# Patient Record
(demographics unavailable — no encounter records)

---

## 2025-01-17 NOTE — DISCUSSION/SUMMARY
[EKG obtained to assist in diagnosis and management of assessed problem(s)] : EKG obtained to assist in diagnosis and management of assessed problem(s) [FreeTextEntry1] : 63 y.o. M - referred by Dr. Meredith - s/p Ao Dissection, s/p Tamponade, mild pericardial effusion, AF s/p Tachymediated CM - repaired by Dr. Meredith - p/f evaluation -------------- --------------  Ankle-brachial index  postexercise declined to 1.04 on the right and 0.70 on the left. ABIs 15  minutes post exercise were 1.03 and 0.83, respectively. SAMUEL - 7-2019 -Impression: Study limited by poor compressibility of the arteries.  TTE 11/2018 - unchanged; diastolic dysfunction Reduced DP pulses  SAMUEL -  - Flow limiting - Monitoring --------------------- --------------------- 1. SOB/ S/P Tamponade - Off Lasix  - Treat empirically for pericarditis if recurs  2. HTN - Continue Toprol - Touch base with  - No need for Valartan - still 110/70 with prior dizziness. Touch base ith Omer She really prefers he is on ACEI/ARB or for nephropathy (still spilling protein) Continue Valsartan 75 - monitor BPs  3. T2DM - Metformin - Januvia  4. HTN and Dizziness - Amlodipine to dissucww with Omer  PAD - Moinitor  AF during ureteral stent - Eliquis 5 BID - Amio - Dr. Jacobs Cannot stop Eliquis for ureteral stent until 4/9 (1 mo post [facility])  EKG - for monitoring of heart disease

## 2025-01-17 NOTE — REASON FOR VISIT
[Follow-Up - Clinic] : a clinic follow-up of [FreeTextEntry2] : Aortic Dissection (Stable; Chronic). T2DM/Obesity (Stable; Chronic). Refractory Hypertension (Stable; Chronic). Patient-Risk (High).

## 2025-01-17 NOTE — HISTORY OF PRESENT ILLNESS
[FreeTextEntry1] :  Perioperative Recommendations (and cardiac clearance) for JOI GEE for placement of urethral stent   - EKG without ischemic changes - Patient is asymptomatic and stable from a cardiac standpoint.  - JOI requires no further cardiac testing prior to procedure and may proceed from cardiac standpoint. -Stop eliquis 2 days prior to procedure  and re-start within 1-2 days after procedure   Héctor Ceja MD    66 y.o. M - referred by Dr. Meredith - s/p Ao Dissection, s/p Tamponade, volume overload, CAD Non-obstructive, Hip Fx - repaired by Dr. Meredith, AF during ureteral stent  - p/f evaluation ------------------ ============ =========== 05/2017 Reports doing well since surgery. Doing well in general. Occasional PND. +Orthopnea.  Off of Amiodarone Some build of swelling ---------- 05/30/2017 Was initially diuresed. Now Off diuretics due to lightheadedness. Concerned about heart problems. Keeps him up at night.  TTE nl except small anterior pericardial effusion  Walks 3-4 blocks before jt pain No SOB/Palps/CP on exrtion  ------------ 08/2017 Admitted for SOB. Increased Lasix. Echo unchanged.  Lasix dose increased from 20 to 40.  ------------ 09/2017 Since last visit, Lasix was at 40 every other day. Prior to this, there was increased BNP to > 900, but Cr 0.76. Still troubled by surgery.  LE better. --------------- 12/2017 On last visit, BNP was 310. Planned to go for hypertension trial with Xena Maurer and Mustapha.  Recent visit to the emergency room with SOB and reduced ambulation; GONZALEZ.  Gets dizzy walking and SOB on limited exertion.  ------------------- 12/20/17 Improved SOB. Improved dizziness. Improved BP. ------------------- 04/2018 Breathing remains stable. BP stable.  One episode of dizziness prompting admission. Discussed taking Lasix less.  HTN/s/p Ao Dissection Repair/T2DM/Obesity - controlled on current treatment, No CP/SOB/Palps/LE Edema/Orthopnea attributable to these medical issues. History obtained from patient and pertinent family members. Outside records reviewed. Labs reviewed from EMR. ------------------- 11/2018 Last visit, discussed taking less Lasix due to dizziness.  Now completely off of it  TTE unchanged today HTN/s/p Ao Dissection Repair/T2DM/Obesity - controlled on current treatment, Checking bloodwork.  No CP/SOB/Palps/LE Edema/Orthopnea attributable to these medical issues. History obtained from patient and pertinent family members. Outside records reviewed. Labs reviewed from EMR. ------------------- 05/2019 Still with dyspnea. Some claudication.  Patient exercised on the treadmill for 5 minutes. Ankle-brachial index  postexercise declined to 1.04 on the right and 0.70 on the left. ABIs 15  minutes post exercise were 1.03 and 0.83, respectively. SAMUEL - 7-2019 -Impression: Study limited by poor compressibility of the arteries.  Exercise stress study demonstrates mild to moderate degree of  exercise-induced flow limitation bilaterally, worse on the left. Consider  arterial duplex study for further evaluation. HTN/s/p Ao Dissection Repair/T2DM/Obesity - controlled on current treatment, Checking bloodwork.  No CP/SOB/Palps/LE Edema/Orthopnea attributable to these medical issues. History obtained from patient and pertinent family members. Outside records reviewed. Labs reviewed from EMR. ---------------------------------   Getting more SOB. On Lasix 20mg doing better. Abnormal SAMUEL Needs PVR/?Galmer -------------------------------------  Very Depressed over medical problems - "no answers" Stopping lasix due to dizziness.  Orthostatic 95/60; 80/55. Stop irbesartan. Reduce coffee.  --------------------------------------  Very Depressed over medical problems - "no answers" Feels limited Stopping lasix due to dizziness.  Orthostatic 95/60; 80/55. Stop irbesartan now back on valsartan.  -----------------------------------  Nuclear Stress -  - Left ventricle enlarged; medium-sized, mild to moderate defects in the basal to mid-inferior, basal septal, and basal inferolateral walls - that are partially reversible suggestive of infarct with mild brooks-infarct. Mid to Distal anteroseptal and apex infarct.  [facility]--Non-obstructive CAD; Calcified vessels. Need to find cth report Predominantly dizziness; not necessarily SOB.  Has been off Lasix.  On valsartan. On Metoprolol  -------------------------------------  Re-started on antihypertensive for kidney protection from DM Touch base lan Tello She really prefers he is on ACEI/ARB or for nephropathy (still spilling protein) Continue Valsartan 75 - monitor BPs ---------------------------------------  No more claudication ----------------------------------------  Got a kidney lesion removed.  Causing hydronephrosis as they nicked the ureter Preopping  ===========================  Getting another ureteral procedure  -----------------------------------------  H[place] broken  [place]  Look into it and send to pulmonary  ----------------------------------------  Otherwise doing okay  -----------------  Lives now in [place] and comes up for doctrors.  -----------------  Went on ozempic, lost wt  Maintaing No symptoms ----------------- -----------------  AF during ureteral stent Next appt stop ASA 81 and then continue eliquis only Should stop diclofenac Check TTE  ----------------- -----------------  Had Rapid AF -> Reduced LVEF ->  DCCV/Amio  -> Now doing better Now on Amio 200 mg daily now  Cannot stop Eliquis for ureteral stent until 4/9 (1 mo post DCCV) ----------------- -----------------  Needs Ureteral stent released ----------------- -----------------  In [place] s/p AF ablation 6-2023 ablation  ----------------- -----------------  They are concerned re: CA given Wt Loss ----------------- ----------------- 1-2025 CC: Heart Issues - Notes No CP/SOB/Palps/Leg swelling - Reports No F/C/N/V/Headaches - Reports Normal Exercise Tolerance - Reports no medication changes - Reports normal mood/quality of life - Reports no diet changes - Reports no body aches - Reports no recent colds/viruses - Recent labs/imaging reviewed - Relevant Family history reviewed Mother/Father - CV Risk Assessment for 10 Year ACC/AHA Pooled Risk Cohort Equation places this person at > 7.5% Risk of ASCVD  ======================= ======================== ====================== ======================= ---------- EF (Teicholtz): 61 % Doppler Peak Velocity (m/sec): AoV=1.1 ------------------------------------------------------------------------ Observations: Mitral Valve: Normal mitral valve. Minimal mitral regurgitation. Aortic Valve/Aorta: Calcified aortic valve. Peak transaortic valve gradient equals 5 mm Hg, mean transaortic valve gradient equals 3 mm Hg, aortic valve velocity time integral equals 22 cm, estimated aortic valve area equals 2.1 sqcm. Peak left ventricular outflow tract gradient equals 1 mm Hg, mean gradient is equal to 1 mm Hg, LVOT velocity time integral equals 14 cm. Aortic Root: 3.3 cm. Left Atrium: Left atrial enlargement. Left Ventricle: Normal left ventricular systolic function. No segmental wall motion abnormalities. Mild concentric left ventricular hypertrophy. Right Heart: Normal right atrium. Normal right ventricular size and function. Normal tricuspid valve. Minimal tricuspid regurgitation. Normal pulmonic valve. Minimal pulmonic regurgitation. Pericardium/Pleura: Normal pericardium with no pericardial effusion. Hemodynamic: Estimated right atrial pressure is 8 mm Hg. ------------------------------------------------------------------------ Conclusions: 1. Mild concentric left ventricular hypertrophy. 2. Normal left ventricular systolic function. No segmental wall motion abnormalities. *** Compared with echocardiogram of 5/17/2017, the pericardial effusion has resolved. -------------- TTE:  05/17/2017 Conclusions: 1. Normal left ventricular internal dimensions and wall thicknesses. 2. Normal left ventricular systolic function. No segmental wall motion abnormalities. 3. Normal diastolic function 4. Normal right ventricular size and function. 5. Normal pericardium with small anterior pericardial effusion. No echocardiographic signs of tamponade. ------------------------------------------------------------------------ Confirmed on  5/17/2017 - 15:39:31 by Shay Paulson M.D.

## 2025-01-17 NOTE — DISCUSSION/SUMMARY
[EKG obtained to assist in diagnosis and management of assessed problem(s)] : EKG obtained to assist in diagnosis and management of assessed problem(s) [FreeTextEntry1] : 63 y.o. M - referred by Dr. Meredith - s/p Ao Dissection, s/p Tamponade, mild pericardial effusion, AF s/p Tachymediated CM - repaired by Dr. Meredith - p/f evaluation -------------- --------------  Ankle-brachial index  postexercise declined to 1.04 on the right and 0.70 on the left. ABIs 15  minutes post exercise were 1.03 and 0.83, respectively. SAMUEL - 7-2019 -Impression: Study limited by poor compressibility of the arteries.  TTE 11/2018 - unchanged; diastolic dysfunction Reduced DP pulses  SAMUEL -  - Flow limiting - Monitoring --------------------- --------------------- 1. SOB/ S/P Tamponade - Off Lasix  - Treat empirically for pericarditis if recurs  2. HTN - Continue Toprol - Touch base with  - No need for Valartan - still 110/70 with prior dizziness. Touch base ith Omer She really prefers he is on ACEI/ARB or for nephropathy (still spilling protein) Continue Valsartan 75 - monitor BPs  3. T2DM - Metformin - Januvia  4. HTN and Dizziness - Amlodipine to dissucww with Omer  PAD - Moinitor  AF during ureteral stent - Eliquis 5 BID - Amio - Dr. Jacobs Cannot stop Eliquis for ureteral stent until 4/9 (1 mo post [facility])  EKG - for monitoring of heart disease

## 2025-01-17 NOTE — HISTORY OF PRESENT ILLNESS
[FreeTextEntry1] :  Perioperative Recommendations (and cardiac clearance) for JOI GEE for placement of urethral stent   - EKG without ischemic changes - Patient is asymptomatic and stable from a cardiac standpoint.  - JOI requires no further cardiac testing prior to procedure and may proceed from cardiac standpoint. -Stop eliquis 2 days prior to procedure  and re-start within 1-2 days after procedure   Héctor Ceja MD    66 y.o. M - referred by Dr. Meredith - s/p Ao Dissection, s/p Tamponade, volume overload, CAD Non-obstructive, Hip Fx - repaired by Dr. Meredith, AF during ureteral stent  - p/f evaluation ------------------ ============ =========== 05/2017 Reports doing well since surgery. Doing well in general. Occasional PND. +Orthopnea.  Off of Amiodarone Some build of swelling ---------- 05/30/2017 Was initially diuresed. Now Off diuretics due to lightheadedness. Concerned about heart problems. Keeps him up at night.  TTE nl except small anterior pericardial effusion  Walks 3-4 blocks before jt pain No SOB/Palps/CP on exrtion  ------------ 08/2017 Admitted for SOB. Increased Lasix. Echo unchanged.  Lasix dose increased from 20 to 40.  ------------ 09/2017 Since last visit, Lasix was at 40 every other day. Prior to this, there was increased BNP to > 900, but Cr 0.76. Still troubled by surgery.  LE better. --------------- 12/2017 On last visit, BNP was 310. Planned to go for hypertension trial with Xena Maurer and Mustapha.  Recent visit to the emergency room with SOB and reduced ambulation; GONZALEZ.  Gets dizzy walking and SOB on limited exertion.  ------------------- 12/20/17 Improved SOB. Improved dizziness. Improved BP. ------------------- 04/2018 Breathing remains stable. BP stable.  One episode of dizziness prompting admission. Discussed taking Lasix less.  HTN/s/p Ao Dissection Repair/T2DM/Obesity - controlled on current treatment, No CP/SOB/Palps/LE Edema/Orthopnea attributable to these medical issues. History obtained from patient and pertinent family members. Outside records reviewed. Labs reviewed from EMR. ------------------- 11/2018 Last visit, discussed taking less Lasix due to dizziness.  Now completely off of it  TTE unchanged today HTN/s/p Ao Dissection Repair/T2DM/Obesity - controlled on current treatment, Checking bloodwork.  No CP/SOB/Palps/LE Edema/Orthopnea attributable to these medical issues. History obtained from patient and pertinent family members. Outside records reviewed. Labs reviewed from EMR. ------------------- 05/2019 Still with dyspnea. Some claudication.  Patient exercised on the treadmill for 5 minutes. Ankle-brachial index  postexercise declined to 1.04 on the right and 0.70 on the left. ABIs 15  minutes post exercise were 1.03 and 0.83, respectively. SAMUEL - 7-2019 -Impression: Study limited by poor compressibility of the arteries.  Exercise stress study demonstrates mild to moderate degree of  exercise-induced flow limitation bilaterally, worse on the left. Consider  arterial duplex study for further evaluation. HTN/s/p Ao Dissection Repair/T2DM/Obesity - controlled on current treatment, Checking bloodwork.  No CP/SOB/Palps/LE Edema/Orthopnea attributable to these medical issues. History obtained from patient and pertinent family members. Outside records reviewed. Labs reviewed from EMR. ---------------------------------   Getting more SOB. On Lasix 20mg doing better. Abnormal SAMUEL Needs PVR/?Galmer -------------------------------------  Very Depressed over medical problems - "no answers" Stopping lasix due to dizziness.  Orthostatic 95/60; 80/55. Stop irbesartan. Reduce coffee.  --------------------------------------  Very Depressed over medical problems - "no answers" Feels limited Stopping lasix due to dizziness.  Orthostatic 95/60; 80/55. Stop irbesartan now back on valsartan.  -----------------------------------  Nuclear Stress -  - Left ventricle enlarged; medium-sized, mild to moderate defects in the basal to mid-inferior, basal septal, and basal inferolateral walls - that are partially reversible suggestive of infarct with mild brooks-infarct. Mid to Distal anteroseptal and apex infarct.  [facility]--Non-obstructive CAD; Calcified vessels. Need to find cth report Predominantly dizziness; not necessarily SOB.  Has been off Lasix.  On valsartan. On Metoprolol  -------------------------------------  Re-started on antihypertensive for kidney protection from DM Touch base lan [place]ello She really prefers he is on ACEI/ARB or for nephropathy (still spilling protein) Continue Valsartan 75 - monitor BPs ---------------------------------------  No more claudication ----------------------------------------  Got a kidney lesion removed.  Causing hydronephrosis as they nicked the ureter Preopping  ===========================  Getting another ureteral procedure  -----------------------------------------  H[place] broken  went [place]  Look into it and send to pulmonary  ----------------------------------------  Otherwise doing okay  -----------------  [place]ives now in [place] and comes up for doctrors.  -----------------  Went on ozempic, lost wt  Maintaing No symptoms ----------------- -----------------  AF during ureteral stent Next appt stop ASA 81 and then continue eliquis only Should stop diclofenac Check TTE  ----------------- -----------------  Had Rapid AF -> Reduced LVEF ->  DCCV/Amio  -> Now doing better Now on Amio 200 mg daily now  Cannot stop Eliquis for ureteral stent until 4/9 (1 mo post DCCV) ----------------- -----------------  Needs Ureteral stent released ----------------- -----------------  [place] s/p AF ablation 6-2023 ablation  ----------------- -----------------  They are concerned re: CA given Wt Loss ----------------- ----------------- 1-2025 CC: Heart Issues - Notes No CP/SOB/Palps/Leg swelling - Reports No F/C/N/V/Headaches - Reports Normal Exercise Tolerance - Reports no medication changes - Reports normal mood/quality of life - Reports no diet changes - Reports no body aches - Reports no recent colds/viruses - Recent labs/imaging reviewed - Relevant Family history reviewed Mother/Father - CV Risk Assessment for 10 Year ACC/AHA Pooled Risk Cohort Equation places this person at > 7.5% Risk of ASCVD  ======================= ======================== ====================== ======================= ---------- EF (Teicholtz): 61 % Doppler Peak Velocity (m/sec): AoV=1.1 ------------------------------------------------------------------------ Observations: Mitral Valve: Normal mitral valve. Minimal mitral regurgitation. Aortic Valve/Aorta: Calcified aortic valve. Peak transaortic valve gradient equals 5 mm Hg, mean transaortic valve gradient equals 3 mm Hg, aortic valve velocity time integral equals 22 cm, estimated aortic valve area equals 2.1 sqcm. Peak left ventricular outflow tract gradient equals 1 mm Hg, mean gradient is equal to 1 mm Hg, LVOT velocity time integral equals 14 cm. Aortic Root: 3.3 cm. Left Atrium: Left atrial enlargement. Left Ventricle: Normal left ventricular systolic function. No segmental wall motion abnormalities. Mild concentric left ventricular hypertrophy. Right Heart: Normal right atrium. Normal right ventricular size and function. Normal tricuspid valve. Minimal tricuspid regurgitation. Normal pulmonic valve. Minimal pulmonic regurgitation. Pericardium/Pleura: Normal pericardium with no pericardial effusion. Hemodynamic: Estimated right atrial pressure is 8 mm Hg. ------------------------------------------------------------------------ Conclusions: 1. Mild concentric left ventricular hypertrophy. 2. Normal left ventricular systolic function. No segmental wall motion abnormalities. *** Compared with echocardiogram of 5/17/2017, the pericardial effusion has resolved. -------------- TTE:  05/17/2017 Conclusions: 1. Normal left ventricular internal dimensions and wall thicknesses. 2. Normal left ventricular systolic function. No segmental wall motion abnormalities. 3. Normal diastolic function 4. Normal right ventricular size and function. 5. Normal pericardium with small anterior pericardial effusion. No echocardiographic signs of tamponade. ------------------------------------------------------------------------ Confirmed on  5/17/2017 - 15:39:31 by Shay Paulson M.D.